# Patient Record
Sex: MALE | Race: WHITE | NOT HISPANIC OR LATINO | Employment: FULL TIME | URBAN - METROPOLITAN AREA
[De-identification: names, ages, dates, MRNs, and addresses within clinical notes are randomized per-mention and may not be internally consistent; named-entity substitution may affect disease eponyms.]

---

## 2024-09-24 ENCOUNTER — APPOINTMENT (EMERGENCY)
Dept: CT IMAGING | Facility: HOSPITAL | Age: 49
End: 2024-09-24
Payer: OTHER GOVERNMENT

## 2024-09-24 ENCOUNTER — HOSPITAL ENCOUNTER (EMERGENCY)
Facility: HOSPITAL | Age: 49
Discharge: HOME | End: 2024-09-25
Attending: EMERGENCY MEDICINE
Payer: OTHER GOVERNMENT

## 2024-09-24 DIAGNOSIS — E11.65 TYPE 2 DIABETES MELLITUS WITH HYPERGLYCEMIA, WITHOUT LONG-TERM CURRENT USE OF INSULIN (HCC): ICD-10-CM

## 2024-09-24 DIAGNOSIS — R20.2 PARESTHESIAS: Primary | ICD-10-CM

## 2024-09-24 LAB
ANION GAP SERPL CALCULATED.3IONS-SCNC: 12 MMOL/L (ref 4–13)
BASE EX.OXY STD BLDV CALC-SCNC: 93.7 % (ref 60–80)
BASE EXCESS BLDV CALC-SCNC: 0.3 MMOL/L
BUN SERPL-MCNC: 22 MG/DL (ref 5–25)
CALCIUM SERPL-MCNC: 9.3 MG/DL (ref 8.4–10.2)
CHLORIDE SERPL-SCNC: 97 MMOL/L (ref 96–108)
CO2 SERPL-SCNC: 22 MMOL/L (ref 21–32)
CREAT SERPL-MCNC: 0.94 MG/DL (ref 0.6–1.3)
ERYTHROCYTE [DISTWIDTH] IN BLOOD BY AUTOMATED COUNT: 12.8 % (ref 11.6–15.1)
GFR SERPL CREATININE-BSD FRML MDRD: 93 ML/MIN/1.73SQ M
GLUCOSE SERPL-MCNC: 423 MG/DL (ref 65–140)
GLUCOSE SERPL-MCNC: 438 MG/DL (ref 65–140)
HCO3 BLDV-SCNC: 24.3 MMOL/L (ref 24–30)
HCT VFR BLD AUTO: 45.4 % (ref 36.5–49.3)
HGB BLD-MCNC: 15.4 G/DL (ref 12–17)
MCH RBC QN AUTO: 27.2 PG (ref 26.8–34.3)
MCHC RBC AUTO-ENTMCNC: 33.9 G/DL (ref 31.4–37.4)
MCV RBC AUTO: 80 FL (ref 82–98)
O2 CT BLDV-SCNC: 21.5 ML/DL
PCO2 BLDV: 37.7 MM HG (ref 42–50)
PH BLDV: 7.43 [PH] (ref 7.3–7.4)
PLATELET # BLD AUTO: 217 THOUSANDS/UL (ref 149–390)
PMV BLD AUTO: 10.3 FL (ref 8.9–12.7)
PO2 BLDV: 78.4 MM HG (ref 35–45)
POTASSIUM SERPL-SCNC: 3.9 MMOL/L (ref 3.5–5.3)
RBC # BLD AUTO: 5.66 MILLION/UL (ref 3.88–5.62)
SODIUM SERPL-SCNC: 131 MMOL/L (ref 135–147)
WBC # BLD AUTO: 8.84 THOUSAND/UL (ref 4.31–10.16)

## 2024-09-24 PROCEDURE — 99285 EMERGENCY DEPT VISIT HI MDM: CPT | Performed by: EMERGENCY MEDICINE

## 2024-09-24 PROCEDURE — 85027 COMPLETE CBC AUTOMATED: CPT | Performed by: EMERGENCY MEDICINE

## 2024-09-24 PROCEDURE — 70496 CT ANGIOGRAPHY HEAD: CPT

## 2024-09-24 PROCEDURE — 70498 CT ANGIOGRAPHY NECK: CPT

## 2024-09-24 PROCEDURE — 99285 EMERGENCY DEPT VISIT HI MDM: CPT

## 2024-09-24 PROCEDURE — 36415 COLL VENOUS BLD VENIPUNCTURE: CPT | Performed by: EMERGENCY MEDICINE

## 2024-09-24 PROCEDURE — 82805 BLOOD GASES W/O2 SATURATION: CPT | Performed by: EMERGENCY MEDICINE

## 2024-09-24 PROCEDURE — 82948 REAGENT STRIP/BLOOD GLUCOSE: CPT

## 2024-09-24 PROCEDURE — 80048 BASIC METABOLIC PNL TOTAL CA: CPT | Performed by: EMERGENCY MEDICINE

## 2024-09-24 RX ORDER — OMEGA-3S/DHA/EPA/FISH OIL/D3 300MG-1000
400 CAPSULE ORAL DAILY
COMMUNITY

## 2024-09-24 RX ORDER — POTASSIUM CHLORIDE 1500 MG/1
40 TABLET, EXTENDED RELEASE ORAL ONCE
Status: COMPLETED | OUTPATIENT
Start: 2024-09-24 | End: 2024-09-25

## 2024-09-24 RX ADMIN — IOHEXOL 85 ML: 350 INJECTION, SOLUTION INTRAVENOUS at 23:12

## 2024-09-25 VITALS
WEIGHT: 200 LBS | OXYGEN SATURATION: 95 % | BODY MASS INDEX: 30.31 KG/M2 | TEMPERATURE: 98.3 F | DIASTOLIC BLOOD PRESSURE: 83 MMHG | HEART RATE: 72 BPM | SYSTOLIC BLOOD PRESSURE: 132 MMHG | HEIGHT: 68 IN | RESPIRATION RATE: 18 BRPM

## 2024-09-25 LAB
GLUCOSE SERPL-MCNC: 277 MG/DL (ref 65–140)
GLUCOSE SERPL-MCNC: 334 MG/DL (ref 65–140)
GLUCOSE SERPL-MCNC: 400 MG/DL (ref 65–140)

## 2024-09-25 PROCEDURE — 96374 THER/PROPH/DIAG INJ IV PUSH: CPT

## 2024-09-25 PROCEDURE — 82948 REAGENT STRIP/BLOOD GLUCOSE: CPT

## 2024-09-25 RX ADMIN — POTASSIUM CHLORIDE 40 MEQ: 1500 TABLET, EXTENDED RELEASE ORAL at 00:01

## 2024-09-25 RX ADMIN — INSULIN HUMAN 5 UNITS: 100 INJECTION, SOLUTION PARENTERAL at 00:01

## 2024-09-25 NOTE — ED NOTES
0000: . Pt given 5 u humulin R IV and PO K+.     0028: BG rechecked: 334.    0111: BG rechecked: 277. Pt d/c'd home with wife.      Priyanka Eastman RN  09/25/24 0118

## 2024-09-25 NOTE — ED PROVIDER NOTES
1. Paresthesias    2. Type 2 diabetes mellitus with hyperglycemia, without long-term current use of insulin (HCC)      ED Disposition       ED Disposition   Discharge    Condition   Stable    Date/Time   Wed Sep 25, 2024  1:08 AM    Comment   Shahzad Alarcon discharge to home/self care.                   Assessment & Plan       Medical Decision Making    49 y.o. male presenting for hyperglycemia and left upper extremity paresthesias.  Vital stable, nontoxic-appearing on arrival.  NIH stroke scale = 0.  Will order labs to evaluate for anemia, electrolyte abnormality, DKA.  Symptoms possibly related to hyperglycemia however given history of poorly controlled diabetes will obtain CTA of the head/neck to evaluate for CVA or cerebrovascular disease.    Reassessment: Vital stable.  Reviewed labs and CT results with patient.  Hyperglycemia noted without evidence of DKA which was treated with IV insulin.  Reviewed CT results however discussed potential for underlying occult CVA or TIA which would not be evident on CT.    Disposition: Given risk factors for CVA and without other explanation for symptoms patient was offered admission for MRI and further evaluation to exclude CVA or TIA. Patient declined admission at this time and [facility] Will also refer to neurology for further evaluation. The patient is in agreement with this plan.     Discharge Plan: Encouraged compliance with metformin.  Instructed to monitor point-of-care glucose post discharge after receiving intravenous insulin. RTED precautions emphasized. The patient was provided a written after visit summary with strict RTED precautions.     Followup: I have discussed with the patient plan to follow up with their PCP and neurology. Contact information provided in AVS.    Amount and/or Complexity of Data Reviewed  Labs: ordered. Decision-making details documented in ED Course.  Radiology: ordered.    Risk  OTC  drugs.  Prescription drug management.            Stroke Assessment       Row Name 09/24/24 2228             NIH Stroke Scale    Interval Baseline      Level of Consciousness (1a.) 0      LOC Questions (1b.) 0      LOC Commands (1c.) 0      Best Gaze (2.) 0      Visual (3.) 0      Facial Palsy (4.) 0      Motor Arm, Left (5a.) 0      Motor Arm, Right (5b.) 0      Motor Leg, Left (6a.) 0      Motor Leg, Right (6b.) 0      Limb Ataxia (7.) 0      Sensory (8.) 0      Best Language (9.) 0      Dysarthria (10.) 0      Extinction and Inattention (11.) (Formerly Neglect) 0      Total 0                    Flowsheet Row Most Recent Value   Thrombolytic Decision Options    Thrombolytic Decision Patient not a candidate.   Patient is not a candidate options Unclear time of onset outside appropriate time window., Symptoms resolved/clearly non disabling.            ED Course as of 09/25/24 0239   Tue Sep 24, 2024   2222 POC Glucose(!!): 423   Wed Sep 25, 2024   0106 Patient reevaluated. Reviewed labs and CT results. Patient offered admission given potential for CVA/TIA. Patient declines admission at this time and [facility].       Medications   iohexol (OMNIPAQUE) 350 MG/ML injection (MULTI-DOSE) 85 mL (85 mL Intravenous Given 9/24/24 2312)   insulin regular (HumuLIN R,NovoLIN R) injection 5 Units (5 Units Intravenous Given 9/25/24 0001)   potassium chloride (Klor-Con M20) CR tablet 40 mEq (40 mEq Oral Given 9/25/24 0001)       History of Present Illness       Shahzad Alarcon is a 49 y.o. year old male with PMH of T2DM presenting to the [facility] ED for hyperglycemia and left forearm tingling.  Patient reports onset of tingling sensation in the left forearm approximately 1630 this afternoon. His symptoms have waxed/waned since that time. He denies slurred speech, facial droop or focal weakness in extremities. No reported fall or head trauma. No chest pain, dyspnea, nausea/vomiting or abdominal pain. Patient  noted to have glucose greater than 400 on home glucometer. He reports his last hemoglobin A1c was approximately 11.  He has been intermittently compliant with metformin and is not prescribed insulin. [facility]      History provided by:  Medical records and patient   used: No        Review of Systems   Constitutional:  Negative for chills and fever.   Eyes:  Negative for visual disturbance.   Respiratory:  Negative for shortness of breath.    Cardiovascular:  Negative for chest pain.   Gastrointestinal:  Negative for abdominal pain, nausea and vomiting.   Genitourinary:  Negative for dysuria and flank pain.   Musculoskeletal:  Negative for neck pain and neck stiffness.   Neurological:  Positive for numbness. Negative for dizziness, syncope, facial asymmetry, speech difficulty and weakness.   All other systems reviewed and are negative.          Objective     ED Triage Vitals   Temperature Pulse Blood Pressure Respirations SpO2 Patient Position - Orthostatic VS   09/24/24 2224 09/24/24 2219 09/24/24 2219 09/24/24 2220 09/24/24 2219 09/24/24 2220   98.3 °F (36.8 °C) 73 133/82 18 98 % Lying      Temp src Heart Rate Source BP Location FiO2 (%) Pain Score    -- -- 09/24/24 2220 -- 09/24/24 2220      Left arm  No Pain        Physical Exam  Vitals and nursing note reviewed.   Constitutional:       General: He is not in acute distress.     Appearance: Normal appearance. He is well-developed. He is not ill-appearing, toxic-appearing or diaphoretic.   HENT:      Head: Normocephalic and atraumatic.      Nose: No congestion or rhinorrhea.   Eyes:      General:         Right eye: No discharge.         Left eye: No discharge.      Extraocular Movements: Extraocular movements intact.   Cardiovascular:      Rate and Rhythm: Normal rate and regular rhythm.   Pulmonary:      Effort: Pulmonary effort is normal. No respiratory distress.      Breath sounds: Normal breath sounds. No  wheezing or rales.   Abdominal:      General: There is no distension.      Palpations: Abdomen is soft.      Tenderness: There is no abdominal tenderness. There is no right CVA tenderness, left CVA tenderness, guarding or rebound.   Musculoskeletal:      Cervical back: Normal range of motion. No rigidity.   Skin:     General: Skin is warm.      Capillary Refill: Capillary refill takes less than 2 seconds.   Neurological:      Mental Status: He is alert and oriented to person, place, and time.      GCS: GCS eye subscore is 4. GCS verbal subscore is 5. GCS motor subscore is 6.      Cranial Nerves: No cranial nerve deficit, dysarthria or facial asymmetry.      Sensory: Sensation is intact.      Motor: Motor function is intact. No weakness.      Coordination: Coordination is intact.      Comments: Strength +5/5 in bilateral UE/LE.  Sensation grossly intact b/l UE/LE  No vertical nystagmus noted.  CN III, IV and VI intact.  Cerebellar testing: Normal HTS without ataxia.       Psychiatric:         Mood and Affect: Mood and affect and mood normal.         Behavior: Behavior normal.         Labs Reviewed   CBC AND PLATELET - Abnormal       Result Value    WBC 8.84      RBC 5.66 (*)     Hemoglobin 15.4      Hematocrit 45.4      MCV 80 (*)     MCH 27.2      MCHC 33.9      RDW 12.8      Platelets 217      MPV 10.3     BASIC METABOLIC PANEL - Abnormal    Sodium 131 (*)     Potassium 3.9      Chloride 97      CO2 22      ANION GAP 12      BUN 22      Creatinine 0.94      Glucose 438 (*)     Calcium 9.3      eGFR 93      Narrative:     National Kidney Disease Foundation guidelines for Chronic Kidney Disease (CKD):     Stage 1 with normal or high GFR (GFR > 90 mL/min/1.73 square meters)    Stage 2 Mild CKD (GFR = 60-89 mL/min/1.73 square meters)    Stage 3A Moderate CKD (GFR = 45-59 mL/min/1.73 square meters)    Stage 3B Moderate CKD (GFR = 30-44 mL/min/1.73 square meters)    Stage 4 Severe CKD (GFR = 15-29 mL/min/1.73 square  meters)    Stage 5 End Stage CKD (GFR <15 mL/min/1.73 square meters)  Note: GFR calculation is accurate only with a steady state creatinine   BLOOD GAS, VENOUS - Abnormal    pH, Rene 7.427 (*)     pCO2, Rene 37.7 (*)     pO2, Rene 78.4 (*)     HCO3, Rene 24.3      Base Excess, Rene 0.3      O2 Content, Rene 21.5      O2 HGB, VENOUS 93.7 (*)    POCT GLUCOSE - Abnormal    POC Glucose 423 (*)    POCT GLUCOSE - Abnormal    POC Glucose 400 (*)    POCT GLUCOSE - Abnormal    POC Glucose 334 (*)    POCT GLUCOSE - Abnormal    POC Glucose 277 (*)      CTA head and neck with and without contrast   Final Interpretation by Erik Gilbert MD (09/25 0037)         1. No evidence of acute infarct, intracranial hemorrhage or mass.   2. No stenosis, dissection or occlusion of the carotid or vertebral arteries or major vessels of the [place].                  Workstation performed: [id number]             Procedures    ED Medication and Procedure Management   Prior to Admission Medications   Prescriptions Last Dose Informant Patient Reported? Taking?   cholecalciferol (VITAMIN D3) 400 units tablet   Yes Yes   Sig: Take 400 Units by mouth daily   metFORMIN (GLUCOPHAGE) 500 mg tablet 9/24/2024  Yes Yes   Sig: Take 500 mg by mouth 2 (two) times a day with meals   omeprazole (PriLOSEC) 20 mg delayed release capsule 9/24/2024  Yes Yes   Sig: Take 20 mg by mouth daily      Facility-Administered Medications: None     Discharge Medication List as of 9/25/2024  1:10 AM        CONTINUE these medications which have NOT CHANGED    Details   cholecalciferol (VITAMIN D3) 400 units tablet Take 400 Units by mouth daily, Historical Med      metFORMIN (GLUCOPHAGE) 500 mg tablet Take 500 mg by mouth 2 (two) times a day with meals, Historical Med      omeprazole (PriLOSEC) 20 mg delayed release capsule Take 20 mg by mouth daily, Historical Med                Geovanni Kinsey,   09/25/24 0239

## 2024-09-25 NOTE — DISCHARGE INSTRUCTIONS
You have been seen for evaluation of tingling and high blood pressure. Please continue your previously prescribed metformin. Return to the emergency department if you develop worsening tingling, weakness/numbness, facial droop, slurred speech or any other symptoms of concern. Please follow up with your PCP and neurology by calling the number provided.

## 2024-10-21 ENCOUNTER — PREP FOR PROCEDURE (OUTPATIENT)
Dept: GASTROENTEROLOGY | Facility: CLINIC | Age: 49
End: 2024-10-21

## 2024-10-21 ENCOUNTER — TELEPHONE (OUTPATIENT)
Dept: GASTROENTEROLOGY | Facility: CLINIC | Age: 49
End: 2024-10-21

## 2024-10-21 DIAGNOSIS — Z86.0100 HISTORY OF COLON POLYPS: Primary | ICD-10-CM

## 2024-10-21 NOTE — TELEPHONE ENCOUNTER
Spoke with the patient and he scheduled his colonoscopy. Prep instructions mailed to his home.    Scheduled date of colonoscopy (as of today):11/20/24  Physician performing colonoscopy:Daron  Location of colonoscopy:[facility]  Bowel prep reviewed with patient:miralax/dulcolax  Instructions reviewed with patient by:Kayla FOSTER  Clearances: none    10/21/24  Screened by: Kayla Yi MA    Referring Provider[facility]    Pre- Screening:     There is no height or weight on file to calculate BMI.  Has patient been referred for a routine screening Colonoscopy? no  Is the patient between 45-75 years old? yes      Previous Colonoscopy yes   If yes:    Date: September 2024    [facility]    Reason:      SCHEDULING STAFF: If the patient is between 45yrs-49yrs, please advise patient to confirm benefits/coverage with their insurance company for a routine screening colonoscopy, some insurance carriers will only cover at 50yrs or older. If the patient is over 75years old, please schedule an office visit.     Does the patient want to see a Gastroenterologist prior to their procedure OR are they having any GI symptoms? no    Has the patient been hospitalized or had abdominal surgery in the past 6 months? no    Does the patient use supplemental oxygen? no    Does the patient take Coumadin, Lovenox, Plavix, Elliquis, Xarelto, or other blood thinning medication? no    Has the patient had a stroke, cardiac event, or stent placed in the past year? no    SCHEDULING STAFF: If patient answers NO to above questions, then schedule procedure. If patient answers YES to above questions, then schedule office appointment.     If patient is between 45yrs - 49yrs, please advise patient that we will have to confirm benefits & coverage with their insurance company for a routine screening colonoscopy.

## 2024-10-21 NOTE — TELEPHONE ENCOUNTER
Patient would like to direct schedule a colonoscopy.  This is a [facility] referral.  Please phone [phone]  Thank you

## 2024-11-20 ENCOUNTER — ANESTHESIA (OUTPATIENT)
Dept: GASTROENTEROLOGY | Facility: HOSPITAL | Age: 49
End: 2024-11-20
Payer: COMMERCIAL

## 2024-11-20 ENCOUNTER — HOSPITAL ENCOUNTER (OUTPATIENT)
Dept: GASTROENTEROLOGY | Facility: HOSPITAL | Age: 49
Setting detail: OUTPATIENT SURGERY
Discharge: HOME | End: 2024-11-20
Attending: INTERNAL MEDICINE
Payer: COMMERCIAL

## 2024-11-20 ENCOUNTER — ANESTHESIA EVENT (OUTPATIENT)
Dept: GASTROENTEROLOGY | Facility: HOSPITAL | Age: 49
End: 2024-11-20
Payer: COMMERCIAL

## 2024-11-20 VITALS
RESPIRATION RATE: 16 BRPM | TEMPERATURE: 98.1 F | SYSTOLIC BLOOD PRESSURE: 114 MMHG | WEIGHT: 202.38 LBS | DIASTOLIC BLOOD PRESSURE: 79 MMHG | OXYGEN SATURATION: 98 % | HEART RATE: 65 BPM | BODY MASS INDEX: 30.67 KG/M2 | HEIGHT: 68 IN

## 2024-11-20 DIAGNOSIS — Z86.0100 HISTORY OF COLON POLYPS: ICD-10-CM

## 2024-11-20 LAB — GLUCOSE SERPL-MCNC: 116 MG/DL (ref 65–140)

## 2024-11-20 PROCEDURE — 45385 COLONOSCOPY W/LESION REMOVAL: CPT | Performed by: INTERNAL MEDICINE

## 2024-11-20 PROCEDURE — 82948 REAGENT STRIP/BLOOD GLUCOSE: CPT

## 2024-11-20 PROCEDURE — 88305 TISSUE EXAM BY PATHOLOGIST: CPT | Performed by: PATHOLOGY

## 2024-11-20 RX ORDER — SODIUM CHLORIDE, SODIUM LACTATE, POTASSIUM CHLORIDE, CALCIUM CHLORIDE 600; 310; 30; 20 MG/100ML; MG/100ML; MG/100ML; MG/100ML
INJECTION, SOLUTION INTRAVENOUS CONTINUOUS PRN
Status: DISCONTINUED | OUTPATIENT
Start: 2024-11-20 | End: 2024-11-20

## 2024-11-20 RX ORDER — SODIUM CHLORIDE, SODIUM LACTATE, POTASSIUM CHLORIDE, CALCIUM CHLORIDE 600; 310; 30; 20 MG/100ML; MG/100ML; MG/100ML; MG/100ML
75 INJECTION, SOLUTION INTRAVENOUS CONTINUOUS
Status: DISCONTINUED | OUTPATIENT
Start: 2024-11-20 | End: 2024-11-24 | Stop reason: HOSPADM

## 2024-11-20 RX ORDER — ASPIRIN 81 MG/1
81 TABLET, CHEWABLE ORAL DAILY
COMMUNITY

## 2024-11-20 RX ORDER — PROPOFOL 10 MG/ML
INJECTION, EMULSION INTRAVENOUS AS NEEDED
Status: DISCONTINUED | OUTPATIENT
Start: 2024-11-20 | End: 2024-11-20

## 2024-11-20 RX ORDER — LIDOCAINE HCL/PF 100 MG/5ML
SYRINGE (ML) INJECTION AS NEEDED
Status: DISCONTINUED | OUTPATIENT
Start: 2024-11-20 | End: 2024-11-20

## 2024-11-20 RX ADMIN — PROPOFOL 100 MG: 10 INJECTION, EMULSION INTRAVENOUS at 08:06

## 2024-11-20 RX ADMIN — PROPOFOL 50 MG: 10 INJECTION, EMULSION INTRAVENOUS at 08:25

## 2024-11-20 RX ADMIN — SODIUM CHLORIDE, SODIUM LACTATE, POTASSIUM CHLORIDE, AND CALCIUM CHLORIDE 75 ML/HR: .6; .31; .03; .02 INJECTION, SOLUTION INTRAVENOUS at 07:49

## 2024-11-20 RX ADMIN — PROPOFOL 100 MG: 10 INJECTION, EMULSION INTRAVENOUS at 08:22

## 2024-11-20 RX ADMIN — LIDOCAINE HYDROCHLORIDE 50 MG: 20 INJECTION, SOLUTION INTRAVENOUS at 08:06

## 2024-11-20 RX ADMIN — SODIUM CHLORIDE, SODIUM LACTATE, POTASSIUM CHLORIDE, AND CALCIUM CHLORIDE: .6; .31; .03; .02 INJECTION, SOLUTION INTRAVENOUS at 08:02

## 2024-11-20 RX ADMIN — PROPOFOL 100 MG: 10 INJECTION, EMULSION INTRAVENOUS at 08:14

## 2024-11-20 NOTE — ANESTHESIA PREPROCEDURE EVALUATION
"Procedure:  COLONOSCOPY    Relevant Problems   ENDO   (+) Diabetes 1.5, managed as type 2 (HCC)        Physical Exam    Airway    Mallampati score: III  TM Distance: >3 FB  Neck ROM: full     Dental        Cardiovascular      Pulmonary      Other Findings    Anesthesia Plan  ASA Score- 3     Anesthesia Type- IV sedation with anesthesia with ASA Monitors.         Additional Monitors:     Airway Plan:            Plan Factors-    Chart reviewed.   Existing labs reviewed. Patient summary reviewed.                  Induction- intravenous.    Postoperative Plan-     Perioperative Resuscitation Plan - Level 1 - Full Code.       Informed Consent- Anesthetic plan and risks discussed with patient.  I personally reviewed this patient with the CRNA. Discussed and agreed on the Anesthesia Plan with the CRNA..      VITALS  /82   Pulse 84   Temp 97.6 °F (36.4 °C) (Temporal)   Resp 20   Ht 5' 8\" (1.727 m)   Wt 91.8 kg (202 lb 6.1 oz)   SpO2 98%   BMI 30.77 kg/m²  BP Readings from Last 3 Encounters:   11/20/24 125/82   09/24/24 132/83        LABS  Results from Last 12 Months   Lab Units 09/24/24  2231   WBC Thousand/uL 8.84   HEMOGLOBIN g/dL 15.4   HEMATOCRIT % 45.4   PLATELETS Thousands/uL 217     Results from Last 12 Months   Lab Units 09/24/24  2231   SODIUM mmol/L 131*   POTASSIUM mmol/L 3.9   CHLORIDE mmol/L 97   CO2 mmol/L 22   ANION GAP mmol/L 12   BUN mg/dL 22   CREATININE mg/dL 0.94   CALCIUM mg/dL 9.3   GLUCOSE RANDOM mg/dL 438*     No results for input(s): \"APTT\", \"INR\", \"PTT\" in the last 8784 hours.    ECG      ECHOCARDIOGRAPHY OR OTHER TESTING/IMAGING  N/aNo results found for this or any previous visit (from the past 4464 hours).  No results found for this or any previous visit. N/a     ------- ANESTHESIA RISK-BENEFIT DISCUSSION -------  BENEFITS OF A SPECIALIZED ANESTHESIA TEAM INCLUDE (NBK [id number], PMID 81595803):  (1) Reduce mortality and morbidity for major surgeries/procedures. (2) The team provides " analgesia/sedation/amnesia/akinesia as safely as possible. (3) The team strives to reduce discomfort as safely as possible.    RISKS, AND PLANS TO MITIGATE RISKS, INCLUDE:    - Neurologic system: IntraOp awareness (Risk is ~1:1,000 - 1:14,000; PMID 72492636), Stroke (Risk ~<0.1-2% for most cases; PMID 26962902), nerve injury, vision loss, and POCD.     - Airway and Pulmonary system: Dental or mouth injury, throat pain, critical hypoxia, pneumothorax, prolonged intubation, post-op respiratory compromise.  Airway/Intubation risks and prior data: No prior advanced airway notes in [facility] EMR  Major ARISCAT risk factors for pulmonary complications include: none, yielding a score of 0-1= Low risk, 1.6%.  - Cardiovascular system: Hypotension, arrhythmias, cardiac injury or arrest, blood clots, bleeding, infection, vascular injuries.  Anderson's RCRI score items: none, yielding an RCRI Score of 0= 0.4% risk of MACE  Are bhavya-op or intra-op beta blockers indicated? (PMID 64758360): no  - FEN/GI system: Aspiration risk (~0.5% per PMC 6649006) and PONV (10-80% per Apfel score) especially if the patient has not fasted.  ASA NPO guideline compliance?: Yes  - Medication risk assessment: Allergic reactions, excessive bleeding with anticoagulant use, overdoses, drug-drug interactions, injury to a fetus or  in pregnant or breastfeeding patients, bhavya-procedural sedation including while driving/operating machinery.  Recent relevant medications: See MAR or Med Review  Personal or family history of anesthesia complications: no  Pregnancy Status: N/A  - Estimate mortality risks associated with anesthesia based on ASA-PS (PMID 89595093): ASA-PS III: 1:3,500

## 2024-11-20 NOTE — H&P
History and Physical - [facility]  Shahzad Alarcon 49 y.o. male MRN: [id number]                  HPI: Shahzad Alarcon is a 49 y.o. year old male who presents for colonoscopy for colon polyp      REVIEW OF SYSTEMS: Per the HPI, and otherwise unremarkable.    Historical Information   Past Medical History:   Diagnosis Date    Diabetes 1.5, managed as type 2 (HCC)      No past surgical history on file.  Social History   Social History     Substance and Sexual Activity   Alcohol Use Never     Social History     Substance and Sexual Activity   Drug Use Never     Social History     Tobacco Use   Smoking Status Every Day    Types: Cigarettes   Smokeless Tobacco Never     No family history on file.    Meds/Allergies       Current Outpatient Medications:     cholecalciferol (VITAMIN D3) 400 units tablet    metFORMIN (GLUCOPHAGE) 500 mg tablet    omeprazole (PriLOSEC) 20 mg delayed release capsule    semaglutide, 0.25 or 0.5 mg/dose, (Ozempic, 0.25 or 0.5 MG/DOSE,) 2 mg/1.5 mL injection pen    Allergies   Allergen Reactions    Benadryl [Diphenhydramine] Anaphylaxis       Objective     There were no vitals taken for this visit.      PHYSICAL EXAM    Gen: NAD  Head: NCAT  CV: RRR  CHEST: Clear  ABD: soft, NT/ND  EXT: no edema      ASSESSMENT/PLAN:  Shahzad Alarcon is a 49 y.o. year old male who presents for colonoscopy for colon polyp The patient is stable and optimized for the procedure, we reviewed risk and benefits. Risk include but not limited to infection, bleeding, perforation and missing a lesion.

## 2024-11-20 NOTE — ANESTHESIA POSTPROCEDURE EVALUATION
Post-Op Assessment Note    CV Status:  Stable  Pain Score: 0    Pain management: adequate       Mental Status:  Sleepy   PONV Controlled:  None   Airway Patency:  Patent  Two or more mitigation strategies used for obstructive sleep apnea   Post Op Vitals Reviewed: Yes    No anethesia notable event occurred.            Last Filed PACU Vitals:  Vitals Value Taken Time   Temp     Pulse     BP     Resp     SpO2         Modified Alea:  Activity: 2 (11/20/2024  7:32 AM)  Respiration: 2 (11/20/2024  7:32 AM)  Circulation: 2 (11/20/2024  7:32 AM)  Consciousness: 2 (11/20/2024  7:32 AM)  Oxygen Saturation: 2 (11/20/2024  7:32 AM)  Modified Alea Score: 10 (11/20/2024  7:32 AM)

## 2024-11-25 ENCOUNTER — RESULTS FOLLOW-UP (OUTPATIENT)
Dept: GASTROENTEROLOGY | Facility: CLINIC | Age: 49
End: 2024-11-25

## 2024-11-25 PROCEDURE — 88305 TISSUE EXAM BY PATHOLOGIST: CPT | Performed by: PATHOLOGY

## 2024-11-25 NOTE — TELEPHONE ENCOUNTER
----- Message from Hao Neri MD sent at 11/25/2024  4:02 PM EST -----  Hi can you let patient know about precancerous polyp removed.  Repeat colonoscopy in 1 year given polyp.  Thank you.

## 2025-03-18 ENCOUNTER — TELEPHONE (OUTPATIENT)
Age: 50
End: 2025-03-18

## 2025-03-18 NOTE — TELEPHONE ENCOUNTER
We received [facility]raztion[facility]referral to schedule patient for:    Patient is being scheduled with Pod with Dr Ku in [place]. Right foot bunion.    I lvm to [facility] at   [phone]

## 2025-04-03 ENCOUNTER — APPOINTMENT (OUTPATIENT)
Dept: RADIOLOGY | Facility: CLINIC | Age: 50
End: 2025-04-03
Payer: COMMERCIAL

## 2025-04-03 ENCOUNTER — OFFICE VISIT (OUTPATIENT)
Dept: PODIATRY | Facility: CLINIC | Age: 50
End: 2025-04-03

## 2025-04-03 VITALS — BODY MASS INDEX: 30.62 KG/M2 | WEIGHT: 202 LBS | HEIGHT: 68 IN

## 2025-04-03 DIAGNOSIS — M79.671 PAIN IN RIGHT FOOT: Primary | ICD-10-CM

## 2025-04-03 DIAGNOSIS — B35.3 TINEA PEDIS OF BOTH FEET: ICD-10-CM

## 2025-04-03 DIAGNOSIS — B35.1 ONYCHOMYCOSIS: ICD-10-CM

## 2025-04-03 DIAGNOSIS — M79.671 PAIN IN RIGHT FOOT: ICD-10-CM

## 2025-04-03 DIAGNOSIS — M20.5X1 HALLUX LIMITUS OF RIGHT FOOT: ICD-10-CM

## 2025-04-03 PROCEDURE — 73630 X-RAY EXAM OF FOOT: CPT

## 2025-04-03 RX ORDER — DEXAMETHASONE SODIUM PHOSPHATE 4 MG/ML
2 INJECTION, SOLUTION INTRA-ARTICULAR; INTRALESIONAL; INTRAMUSCULAR; INTRAVENOUS; SOFT TISSUE ONCE
Status: COMPLETED | OUTPATIENT
Start: 2025-04-03 | End: 2025-04-03

## 2025-04-03 RX ORDER — KETOCONAZOLE 20 MG/G
CREAM TOPICAL DAILY
Qty: 60 G | Refills: 2 | Status: SHIPPED | OUTPATIENT
Start: 2025-04-03

## 2025-04-03 RX ORDER — CICLOPIROX 80 MG/ML
SOLUTION TOPICAL
Qty: 6.6 ML | Refills: 3 | Status: SHIPPED | OUTPATIENT
Start: 2025-04-03

## 2025-04-03 RX ORDER — LIDOCAINE HYDROCHLORIDE 10 MG/ML
1.5 INJECTION, SOLUTION EPIDURAL; INFILTRATION; INTRACAUDAL; PERINEURAL ONCE
Status: COMPLETED | OUTPATIENT
Start: 2025-04-03 | End: 2025-04-03

## 2025-04-03 RX ADMIN — DEXAMETHASONE SODIUM PHOSPHATE 2 MG: 4 INJECTION, SOLUTION INTRA-ARTICULAR; INTRALESIONAL; INTRAMUSCULAR; INTRAVENOUS; SOFT TISSUE at 09:30

## 2025-04-03 RX ADMIN — LIDOCAINE HYDROCHLORIDE 1.5 ML: 10 INJECTION, SOLUTION EPIDURAL; INFILTRATION; INTRACAUDAL; PERINEURAL at 09:30

## 2025-04-03 NOTE — PROGRESS NOTES
Name: Shahzad Alarcon      : 1975      MRN: [id number]  Encounter Provider: Harrison Miller DPM  Encounter Date: 4/3/2025   Encounter department: [facility] [place]  :  Assessment & Plan  Pain in right foot    Orders:    XR foot 3+ vw right; Future    Hallux limitus of right foot    Orders:    dexamethasone (DECADRON) injection 2 mg    lidocaine (PF) (XYLOCAINE-MPF) 1 % injection 1.5 mL    Onychomycosis    Orders:    ciclopirox (PENLAC) 8 % solution; Apply topically daily at bedtime    Tinea pedis of both feet    Orders:    ketoconazole (NIZORAL) 2 % cream; Apply topically daily    -For his nail and athlete's foot we will treat him with topicals, Rx sent for Penlac and advised on use and also ketoconazole.  - Stage 3 to stage 4 hallux limitus  -We are having technical difficulties with x-ray.  We finally got the x-rays to work, and x-rays 3 views weightbearing taken reviewed of the right foot and show boxing of the first metatarsal which is rather short, mild first metatarsal elevatus with dorsal flag sign and also some spurring along the base of the proximal phalanx as well.  It is likely that he initially had functional hallux limitus that she slowly changed into structural hallux limitus.  -With his severe amount of pain and crepitus on examination, he likely has very end-stage arthritis of his right big toe.  I did discuss shoe gear changes and I would recommend a more rigid shoe and shoes at all times in the house and we will also inject his right first MTPJ  - He is to return in 2 months for repeat assessment of his pain and potential further imaging versus potential plan for surgical intervention.      Small joint arthrocentesis: R great MTP  Universal Protocol:  Consent: Verbal consent obtained.  Risks and benefits: risks, benefits and alternatives were discussed  Consent given by: patient  Patient understanding: patient states understanding of the procedure being  "performed  Patient consent: the patient's understanding of the procedure matches consent given  Patient identity confirmed: verbally with patient  Supporting Documentation  Indications: pain, joint swelling and diagnostic evaluation   Procedure Details  Location: great toe - R great MTP  Ultrasound guidance: no    Patient tolerance: patient tolerated the procedure well with no immediate complications  Dressing:  Sterile dressing applied            History of Present Illness   HPI  Shahzad Alarcon is a 49 y.o. male who presents for evaluation and management of his right foot. Complaining of \"bunion\" pain. Has shooting pains in his big toe. One of the worse things that he has felt. Worse at night, worse when he is sitting down.       Review of Systems   Constitutional:  Negative for chills and fever.   HENT:  Negative for ear pain and sore throat.    Eyes:  Negative for pain and visual disturbance.   Respiratory:  Negative for cough and shortness of breath.    Cardiovascular:  Negative for chest pain and palpitations.   Gastrointestinal:  Negative for abdominal pain and vomiting.   Genitourinary:  Negative for dysuria and hematuria.   Musculoskeletal:  Negative for arthralgias and back pain.   Skin:  Negative for color change and rash.   Neurological:  Negative for seizures and syncope.   All other systems reviewed and are negative.         Objective   Ht 5' 8\" (1.727 m)   Wt 91.6 kg (202 lb)   BMI 30.71 kg/m²      Physical Exam  Constitutional:       Appearance: Normal appearance.   HENT:      Head: Normocephalic and atraumatic.      Nose: Nose normal.      Mouth/Throat:      Mouth: Mucous membranes are moist.   Eyes:      Pupils: Pupils are equal, round, and reactive to light.   Cardiovascular:      Pulses: Normal pulses.   Pulmonary:      Effort: Pulmonary effort is normal.   Musculoskeletal:         General: Tenderness present.      Comments: Parathesias.  There is thickened discolored toenails to all 10 " digits.  There is dry cracked scaly skin Wakstein distribution bilateral feet.  He does have a very minimal bunion deformity, he does have some small erythema overlying his right first MTPJ.  But there is decreased range of motion of the first MPJ with pain at end range of motion dorsiflexion and plantarflexion.   Skin:     Capillary Refill: Capillary refill takes 2 to 3 seconds.   Neurological:      General: No focal deficit present.      Mental Status: He is alert and oriented to person, place, and time. Mental status is at baseline.

## 2025-04-03 NOTE — PATIENT INSTRUCTIONS
Carbon fiber foot plate      Procedures: 1.  Dorothy procedure (first MTPJ fusion)  2.  Siliclastic joint implant  3. Henderson procedure.

## 2025-06-27 ENCOUNTER — OFFICE VISIT (OUTPATIENT)
Dept: PODIATRY | Facility: CLINIC | Age: 50
End: 2025-06-27
Payer: COMMERCIAL

## 2025-06-27 VITALS — HEIGHT: 68 IN | BODY MASS INDEX: 30.62 KG/M2 | WEIGHT: 202 LBS

## 2025-06-27 DIAGNOSIS — M20.5X1 HALLUX LIMITUS OF RIGHT FOOT: Primary | ICD-10-CM

## 2025-06-27 DIAGNOSIS — G62.9 NEUROPATHY: ICD-10-CM

## 2025-06-27 PROCEDURE — 99214 OFFICE O/P EST MOD 30 MIN: CPT | Performed by: PODIATRIST

## 2025-06-27 RX ORDER — GABAPENTIN 300 MG/1
300 CAPSULE ORAL
Qty: 90 CAPSULE | Refills: 0 | Status: SHIPPED | OUTPATIENT
Start: 2025-06-27